# Patient Record
Sex: MALE | Race: WHITE | Employment: PART TIME | URBAN - METROPOLITAN AREA
[De-identification: names, ages, dates, MRNs, and addresses within clinical notes are randomized per-mention and may not be internally consistent; named-entity substitution may affect disease eponyms.]

---

## 2021-03-24 ENCOUNTER — OFFICE VISIT (OUTPATIENT)
Dept: FAMILY MEDICINE CLINIC | Facility: CLINIC | Age: 21
End: 2021-03-24
Payer: COMMERCIAL

## 2021-03-24 VITALS
SYSTOLIC BLOOD PRESSURE: 110 MMHG | BODY MASS INDEX: 20.44 KG/M2 | RESPIRATION RATE: 18 BRPM | HEART RATE: 88 BPM | DIASTOLIC BLOOD PRESSURE: 70 MMHG | WEIGHT: 138 LBS | HEIGHT: 69 IN

## 2021-03-24 DIAGNOSIS — R13.10 DIFFICULTY SWALLOWING SOLIDS: ICD-10-CM

## 2021-03-24 DIAGNOSIS — Z00.00 ROUTINE GENERAL MEDICAL EXAMINATION AT A HEALTH CARE FACILITY: Primary | ICD-10-CM

## 2021-03-24 PROCEDURE — 3008F BODY MASS INDEX DOCD: CPT | Performed by: FAMILY MEDICINE

## 2021-03-24 PROCEDURE — 3074F SYST BP LT 130 MM HG: CPT | Performed by: FAMILY MEDICINE

## 2021-03-24 PROCEDURE — 99385 PREV VISIT NEW AGE 18-39: CPT | Performed by: FAMILY MEDICINE

## 2021-03-24 PROCEDURE — 3078F DIAST BP <80 MM HG: CPT | Performed by: FAMILY MEDICINE

## 2021-03-24 RX ORDER — OMEPRAZOLE 40 MG/1
40 CAPSULE, DELAYED RELEASE ORAL DAILY
Qty: 90 CAPSULE | Refills: 0 | Status: SHIPPED | OUTPATIENT
Start: 2021-03-24

## 2021-03-24 NOTE — PROGRESS NOTES
Priya Angel is a 24year old male with no significant past medical history who presents for an annual physical. Pt is currently enrolled at [facility] of [address] [place] [address]. Patient complains of difficulty swallowing.   Over the last month, he has noted a few episodes wh RRR without murmur  GI: good BS's and no masses, HSM or tenderness  : two descended testes,no masses,no hernia,no penile lesions  MUSCULOSKELETAL: back is not tender and FROM of the back, no evidence of scoliosis  EXTREMITIES: no deformity, no swelling

## 2021-03-24 NOTE — PATIENT INSTRUCTIONS
Prevention Guidelines, Men Ages 25 to 44  Screening tests and vaccines are an important part of managing your health. A screening test is done to find possible disorders or diseases in people who don't have any symptoms.  The goal is to find a disease ear vaccine 2 doses; the second dose should be given at least 4 weeks after the first dose   Hepatitis A Men at increased risk for infection – talk with your healthcare provider 2 doses given at least 6 months apart   Hepatitis B Men at increased risk for infe be reminded to avoid intentional tanning and tanning beds. 1Those who are 25years of age, who are not up-to-date on their childhood immunizations, should get all appropriate catch-up vaccines recommended by the CDC.    Usman last reviewed this educat

## 2021-04-18 ENCOUNTER — HOSPITAL ENCOUNTER (EMERGENCY)
Facility: HOSPITAL | Age: 21
Discharge: HOME | End: 2021-04-18
Attending: PEDIATRICS
Payer: COMMERCIAL

## 2021-04-18 ENCOUNTER — APPOINTMENT (OUTPATIENT)
Dept: GENERAL RADIOLOGY | Facility: HOSPITAL | Age: 21
End: 2021-04-18
Attending: PEDIATRICS
Payer: COMMERCIAL

## 2021-04-18 VITALS
WEIGHT: 139 LBS | DIASTOLIC BLOOD PRESSURE: 71 MMHG | SYSTOLIC BLOOD PRESSURE: 112 MMHG | HEART RATE: 78 BPM | BODY MASS INDEX: 20.59 KG/M2 | RESPIRATION RATE: 20 BRPM | HEIGHT: 69 IN | OXYGEN SATURATION: 97 % | TEMPERATURE: 98 F

## 2021-04-18 DIAGNOSIS — R04.2 HEMOPTYSIS: Primary | ICD-10-CM

## 2021-04-18 PROCEDURE — 99283 EMERGENCY DEPT VISIT LOW MDM: CPT

## 2021-04-18 PROCEDURE — 71045 X-RAY EXAM CHEST 1 VIEW: CPT | Performed by: PEDIATRICS

## 2021-04-18 RX ORDER — CETIRIZINE HYDROCHLORIDE 10 MG/1
10 TABLET ORAL DAILY
COMMUNITY

## 2021-04-18 NOTE — ED INITIAL ASSESSMENT (HPI)
States while he was in the shower he had the urge to spit. States some blood was in his saliva. Denies coughing up blood. Denies fever, cough, unusual bruising or rashes.

## 2021-04-19 NOTE — ED PROVIDER NOTES
Patient Seen in: [facility] Emergency Department      History   Patient presents with:   Other    Stated Complaint: Pt rpt spitting blood about 1hour ago; denies vomiting blood or coughing blood,*    HPI/Subjective:   HPI    20-year-old male previous membrane and external ear normal.      Left Ear: Tympanic membrane and external ear normal.      Nose: Nose normal.      Mouth/Throat:      Mouth: Mucous membranes are moist.      Pharynx: No oropharyngeal exudate or posterior oropharyngeal erythema. independently visualized and interpreted by myself, along with review of radiologist's interpretation. XR CHEST AP PORTABLE  (CPT=71045)    Result Date: 4/18/2021  CONCLUSION:  No active disease seen.   Dictated by (CST): Cyd Kehr, MD on 4/18/2021 [id number]  [phone]    As needed, If symptoms worsen          Medications Prescribed:  Discharge Medication List as of 4/18/2021  8:03 PM

## 2021-05-23 ENCOUNTER — IMMUNIZATION (OUTPATIENT)
Dept: LAB | Facility: HOSPITAL | Age: 21
End: 2021-05-23
Attending: EMERGENCY MEDICINE
Payer: COMMERCIAL

## 2021-05-23 DIAGNOSIS — Z23 NEED FOR VACCINATION: Primary | ICD-10-CM

## 2021-05-23 PROCEDURE — 0001A SARSCOV2 VAC 30MCG/0.3ML IM: CPT

## 2021-06-19 ENCOUNTER — IMMUNIZATION (OUTPATIENT)
Dept: LAB | Facility: HOSPITAL | Age: 21
End: 2021-06-19
Attending: EMERGENCY MEDICINE
Payer: COMMERCIAL

## 2021-06-19 DIAGNOSIS — Z23 NEED FOR VACCINATION: Primary | ICD-10-CM

## 2021-06-19 PROCEDURE — 0002A SARSCOV2 VAC 30MCG/0.3ML IM: CPT

## 2022-01-18 ENCOUNTER — IMMUNIZATION (OUTPATIENT)
Dept: LAB | Facility: HOSPITAL | Age: 22
End: 2022-01-18
Attending: EMERGENCY MEDICINE
Payer: COMMERCIAL

## 2022-01-18 DIAGNOSIS — Z23 NEED FOR VACCINATION: Primary | ICD-10-CM

## 2022-01-18 PROCEDURE — 0054A SARSCOV2 VAC 30MCG TRS SUCR: CPT

## 2023-08-05 ENCOUNTER — HOSPITAL ENCOUNTER (OUTPATIENT)
Age: 23
Discharge: HOME | End: 2023-08-05
Attending: EMERGENCY MEDICINE
Payer: COMMERCIAL

## 2023-08-05 ENCOUNTER — OFFICE VISIT (OUTPATIENT)
Dept: FAMILY MEDICINE CLINIC | Facility: CLINIC | Age: 23
End: 2023-08-05
Payer: COMMERCIAL

## 2023-08-05 VITALS
TEMPERATURE: 97 F | RESPIRATION RATE: 16 BRPM | OXYGEN SATURATION: 100 % | DIASTOLIC BLOOD PRESSURE: 59 MMHG | WEIGHT: 150 LBS | SYSTOLIC BLOOD PRESSURE: 99 MMHG | HEIGHT: 69 IN | HEART RATE: 73 BPM | BODY MASS INDEX: 22.22 KG/M2

## 2023-08-05 VITALS
WEIGHT: 150 LBS | HEIGHT: 69 IN | OXYGEN SATURATION: 99 % | DIASTOLIC BLOOD PRESSURE: 68 MMHG | HEART RATE: 74 BPM | RESPIRATION RATE: 16 BRPM | SYSTOLIC BLOOD PRESSURE: 98 MMHG | BODY MASS INDEX: 22.22 KG/M2 | TEMPERATURE: 98 F

## 2023-08-05 DIAGNOSIS — K52.9 ENTERITIS: Primary | ICD-10-CM

## 2023-08-05 DIAGNOSIS — A09 TRAVELER'S DIARRHEA: ICD-10-CM

## 2023-08-05 DIAGNOSIS — A09 TRAVELER'S DIARRHEA: Primary | ICD-10-CM

## 2023-08-05 PROCEDURE — 99213 OFFICE O/P EST LOW 20 MIN: CPT

## 2023-08-05 RX ORDER — ONDANSETRON 4 MG/1
4 TABLET, ORALLY DISINTEGRATING ORAL EVERY 4 HOURS PRN
Qty: 10 TABLET | Refills: 0 | Status: SHIPPED | OUTPATIENT
Start: 2023-08-05 | End: 2023-08-12

## 2023-08-05 NOTE — DISCHARGE INSTRUCTIONS
Try to collect a stool panel and bring it to outpatient lab at [facility].  Drink frequent small amounts of clear liquids-water, electrolyte fluid, soup broth. Advance to crackers, soft rice, toast as tolerated. Avoid dairy, milk and raw vegetables until you are well. Return for any problems.

## 2023-08-05 NOTE — ED INITIAL ASSESSMENT (HPI)
Patient reports diarrheal stools 3-5 times a day. No blood noted. No fever. Denies urinary symptoms.

## 2023-08-05 NOTE — ED INITIAL ASSESSMENT (HPI)
Patient presents to  with c/o travelers diarrhea x 6 days. Just back from [place].Came from [facility]

## 2024-02-26 ENCOUNTER — OFFICE VISIT (OUTPATIENT)
Dept: FAMILY MEDICINE CLINIC | Facility: CLINIC | Age: 24
End: 2024-02-26
Payer: COMMERCIAL

## 2024-02-26 VITALS
WEIGHT: 165.81 LBS | HEART RATE: 92 BPM | TEMPERATURE: 97 F | RESPIRATION RATE: 18 BRPM | DIASTOLIC BLOOD PRESSURE: 80 MMHG | SYSTOLIC BLOOD PRESSURE: 100 MMHG | HEIGHT: 69 IN | BODY MASS INDEX: 24.56 KG/M2

## 2024-02-26 DIAGNOSIS — S46.811A TRAPEZIUS STRAIN, RIGHT, INITIAL ENCOUNTER: Primary | ICD-10-CM

## 2024-02-26 PROCEDURE — 3079F DIAST BP 80-89 MM HG: CPT | Performed by: FAMILY MEDICINE

## 2024-02-26 PROCEDURE — 99214 OFFICE O/P EST MOD 30 MIN: CPT | Performed by: FAMILY MEDICINE

## 2024-02-26 PROCEDURE — 3008F BODY MASS INDEX DOCD: CPT | Performed by: FAMILY MEDICINE

## 2024-02-26 PROCEDURE — 3074F SYST BP LT 130 MM HG: CPT | Performed by: FAMILY MEDICINE

## 2024-02-26 RX ORDER — CYCLOBENZAPRINE HCL 10 MG
10 TABLET ORAL NIGHTLY PRN
Qty: 20 TABLET | Refills: 0 | Status: SHIPPED | OUTPATIENT
Start: 2024-02-26

## 2024-02-26 RX ORDER — PREDNISONE 10 MG/1
TABLET ORAL
Qty: 30 TABLET | Refills: 0 | Status: SHIPPED | OUTPATIENT
Start: 2024-02-26

## 2024-02-26 NOTE — PROGRESS NOTES
[facility] Family Medicine Office Note  Chief Complaint:   Chief Complaint   Patient presents with    Back Pain       HPI:   This is a 23 year old male coming in for right-sided neck pain. Pain started about 1.5 weeks ago. Inciting event was lifting weights at the gym. Pt doesn't have pain radiating into the arm. Pt denies tingling in the arm/hand. Pt reports weakness in the arm. Pain is worsened by lifting. Pain is lessened by nothing - no improvement with advil.  Pain is 5/10.        No past medical history on file.  No past surgical history on file.  Social History:  Social History     Socioeconomic History    Marital status: Single   Tobacco Use    Smoking status: Never    Smokeless tobacco: Never   Vaping Use    Vaping Use: Never used   Substance and Sexual Activity    Alcohol use: Never    Drug use: Never     Family History:  Family History   Problem Relation Age of Onset    Cancer Paternal Grandmother     Cancer Paternal Grandfather      Allergies:  Allergies   Allergen Reactions    Lactose OTHER (SEE COMMENTS)     Digestion problem     Current Meds:  Current Outpatient Medications   Medication Sig Dispense Refill    predniSONE 10 MG Oral Tab 4 tabs by mouth daily x4 days, 3 tabs by mouth daily x3 days, 2 tabs by mouth daily x2 days, 1 tab by mouth daily x1 day 30 tablet 0    cyclobenzaprine 10 MG Oral Tab Take 1 tablet (10 mg total) by mouth nightly as needed. 20 tablet 0    cetirizine 10 MG Oral Tab Take 1 tablet (10 mg total) by mouth daily.        Counseling given: Not Answered       REVIEW OF SYSTEMS:   ROS:  CONSTITUTIONAL:  Denies any unusual weight gain/loss, fever, chills, weakness or fatigue.  CARDIOVASCULAR:  Denies chest pain, chest pressure or chest discomfort. No palpitations or edema.  Denies any dyspnea on exertion or at rest  RESPIRATORY:  Denies shortness of breath, cough  GASTROINTESTINAL:  Denies any abdominal pain, nausea, vomiting  NEUROLOGICAL:  Denies headache, dizziness,  syncope, numbness or tingling in the extremities.  MUSCULOSKELETAL:  + neck pain    EXAM:   /80   Pulse 92   Temp 97 °F (36.1 °C) (Temporal)   Resp 18   Ht 5' 9\" (1.753 m)   Wt 165 lb 12.8 oz (75.2 kg)   BMI 24.48 kg/m²  Estimated body mass index is 24.48 kg/m² as calculated from the following:    Height as of this encounter: 5' 9\" (1.753 m).    Weight as of this encounter: 165 lb 12.8 oz (75.2 kg).   Vital signs reviewed.Appears stated age, well groomed.  Physical Exam:  GEN:  Patient is alert and oriented x3, no apparent distress  HEAD:  Normocephalic, atraumatic  NECK: + tight right trapezius muscle  LUNGS: clear to auscultation bilaterally, no rales/rhonchi/wheezing  HEART:  Regular rate and rhythm, no murmurs, rubs or gallops  ABDOMEN:  Soft, nondistended, nontender, bowel sounds normal in all 4 quadrants, no hepatosplenomegaly  EXTREMITIES:  Strength intact with 5/5 bilaterally upper and lower extremities, no edema noted  NEURO:  CN 2 - 12 grossly intact     ASSESSMENT AND PLAN:   1. Trapezius strain, right, initial encounter  -  new onset  -  start prednisone taper  -  flexeril PRN  -  recommend ice/heat alternating  -  start PT if no improvement  -  f/u in 1-2 months for annual physical exam  - predniSONE 10 MG Oral Tab; 4 tabs by mouth daily x4 days, 3 tabs by mouth daily x3 days, 2 tabs by mouth daily x2 days, 1 tab by mouth daily x1 day  Dispense: 30 tablet; Refill: 0  - cyclobenzaprine 10 MG Oral Tab; Take 1 tablet (10 mg total) by mouth nightly as needed.  Dispense: 20 tablet; Refill: 0  - Physical Therapy Referral - [facility]      Meds & Refills for this Visit:  Requested Prescriptions     Signed Prescriptions Disp Refills    predniSONE 10 MG Oral Tab 30 tablet 0     Si tabs by mouth daily x4 days, 3 tabs by mouth daily x3 days, 2 tabs by mouth daily x2 days, 1 tab by mouth daily x1 day    cyclobenzaprine 10 MG Oral Tab 20 tablet 0     Sig: Take 1 tablet (10 mg total) by mouth  nightly as needed.       Health Maintenance:  Health Maintenance Due   Topic Date Due    HPV Vaccines (1 - Male 3-dose series) Never done    DTaP,Tdap,and Td Vaccines (1 - Tdap) Never done    Annual Physical  03/24/2022    COVID-19 Vaccine (4 - 2023-24 season) 09/01/2023    Influenza Vaccine (1) Never done    Annual Depression Screening  Never done       Patient/Caregiver Education: Patient/Caregiver Education: There are no barriers to learning. Medical education done.   Outcome: Patient verbalizes understanding. Patient is notified to call with any questions, complications, allergies, or worsening or changing symptoms.  Patient is to call with any side effects or complications from the treatments as a result of today.     Problem List:  There is no problem list on file for this patient.      PRISCILLA ESPINOZA, DO    Please note that portions of this note may have been completed with a voice recognition program. Efforts were made to edit the dictations but occasionally words are mis-transcribed.

## 2025-04-30 ENCOUNTER — OFFICE VISIT (OUTPATIENT)
Dept: FAMILY MEDICINE CLINIC | Facility: CLINIC | Age: 25
End: 2025-04-30
Payer: COMMERCIAL

## 2025-04-30 VITALS
WEIGHT: 165 LBS | DIASTOLIC BLOOD PRESSURE: 56 MMHG | TEMPERATURE: 100 F | OXYGEN SATURATION: 97 % | SYSTOLIC BLOOD PRESSURE: 110 MMHG | HEART RATE: 108 BPM | BODY MASS INDEX: 24 KG/M2 | RESPIRATION RATE: 18 BRPM

## 2025-04-30 DIAGNOSIS — R68.89 FLU-LIKE SYMPTOMS: ICD-10-CM

## 2025-04-30 DIAGNOSIS — J06.9 URI WITH COUGH AND CONGESTION: Primary | ICD-10-CM

## 2025-04-30 PROCEDURE — 3078F DIAST BP <80 MM HG: CPT | Performed by: PHYSICIAN ASSISTANT

## 2025-04-30 PROCEDURE — 99213 OFFICE O/P EST LOW 20 MIN: CPT | Performed by: PHYSICIAN ASSISTANT

## 2025-04-30 PROCEDURE — 87637 SARSCOV2&INF A&B&RSV AMP PRB: CPT | Performed by: PHYSICIAN ASSISTANT

## 2025-04-30 PROCEDURE — 3074F SYST BP LT 130 MM HG: CPT | Performed by: PHYSICIAN ASSISTANT

## 2025-04-30 RX ORDER — OSELTAMIVIR PHOSPHATE 75 MG/1
75 CAPSULE ORAL 2 TIMES DAILY
Qty: 10 CAPSULE | Refills: 0 | Status: SHIPPED | OUTPATIENT
Start: 2025-04-30 | End: 2025-05-05

## 2025-04-30 NOTE — PROGRESS NOTES
CHIEF COMPLAINT:     Chief Complaint   Patient presents with    Fever     X yesterday thought it was allergies, had headache, chills could not sleep, tried to vomit this morning, could not vomit.  Still has headache       HPI:   Kailee Wayne is a 24 year old male who presents for cold symptoms for 1 day.  100.0 degree temp at home. Recently took Advil. + chills. + headache. + nasal congestion. No ear pain. Mild sore throat. Mild cough. No chest pain or SOB. + nausea this am, no vomiting. No diarrhea. No abdominal pain. No covid at home testing. Taking advil OTC. Has not been drinking much water today       Current Medications[1]   Past Medical History[2]   Past Surgical History[3]   Family History[4]   Short Social Hx on File[5]      REVIEW OF SYSTEMS:   GENERAL:  denies  diminished appetite  SKIN: no rashes or abnormal skin lesions  HEENT: See HPI.    LUNGS: denies shortness of breath or wheezing, See HPI  CARDIOVASCULAR: denies chest pain or palpitations   GI: denies V/C or abdominal pain  NEURO: + headaches.  No numbness or tingling in face.    EXAM:   /56   Pulse 108   Temp 99.8 °F (37.7 °C) (Oral)   Resp 18   Wt 165 lb (74.8 kg)   SpO2 97%   BMI 24.37 kg/m²   Physical Exam  Constitutional:       General: He is not in acute distress.     Appearance: Normal appearance.   HENT:      Head: Normocephalic.      Right Ear: Tympanic membrane, ear canal and external ear normal.      Left Ear: Tympanic membrane, ear canal and external ear normal.      Nose: Rhinorrhea present.      Mouth/Throat:      Mouth: Mucous membranes are moist.      Pharynx: Oropharynx is clear. Uvula midline. Posterior oropharyngeal erythema (post nasal drip) present.      Tonsils: No tonsillar exudate.   Eyes:      Conjunctiva/sclera: Conjunctivae normal.      Pupils: Pupils are equal, round, and reactive to light.   Cardiovascular:      Rate and Rhythm: Normal rate and regular rhythm.      Heart sounds: Normal heart sounds. No  murmur heard.  Pulmonary:      Effort: Pulmonary effort is normal. No respiratory distress.      Breath sounds: Normal breath sounds. No wheezing or rhonchi.   Chest:      Chest wall: No tenderness.   Musculoskeletal:      Cervical back: Normal range of motion and neck supple.   Lymphadenopathy:      Cervical: No cervical adenopathy.   Skin:     General: Skin is warm.      Findings: No rash.   Neurological:      Mental Status: He is alert and oriented to person, place, and time.          No results found for this or any previous visit (from the past 24 hours).    ASSESSMENT AND PLAN:   Kailee Wayne is a 24 year old male who presents with URI symptoms that are consistent with:      ASSESSMENT:  Encounter Diagnoses   Name Primary?    Flu-like symptoms     URI with cough and congestion Yes     Quad test sent out   Tamiflu to hold if influenza is positive       PLAN: Meds as below.  Comfort care instructions as listed in Patient Instructions    Meds & Refills for this Visit:  Requested Prescriptions     Signed Prescriptions Disp Refills    oseltamivir 75 MG Oral Cap 10 capsule 0     Sig: Take 1 capsule (75 mg total) by mouth 2 (two) times daily for 5 days.       Risks, benefits, side effects of medication addressed and explained.    Patient Instructions   Rest   Push fluids   Tylenol or ibuprofen OTC as needed for pain/fever   Zyrtec OTC once daily for nasal drainage  Delsym OTC as needed for cough   If influenza testing positive, ok to start Tamiflu   Contagious until fever free for 24 hours without the help of medications ( tylenol or ibuprofen)    Please follow up with PCP if no improvement or if symptoms worsen      The patient indicates understanding of these issues and agrees to the plan.  The patient is asked to f/u with PCP if sx's persist or worsen.         [1]   Current Outpatient Medications   Medication Sig Dispense Refill    oseltamivir 75 MG Oral Cap Take 1 capsule (75 mg total) by mouth 2 (two)  times daily for 5 days. 10 capsule 0    cetirizine 10 MG Oral Tab Take 1 tablet (10 mg total) by mouth daily.      predniSONE 10 MG Oral Tab 4 tabs by mouth daily x4 days, 3 tabs by mouth daily x3 days, 2 tabs by mouth daily x2 days, 1 tab by mouth daily x1 day (Patient not taking: Reported on 4/30/2025) 30 tablet 0    cyclobenzaprine 10 MG Oral Tab Take 1 tablet (10 mg total) by mouth nightly as needed. (Patient not taking: Reported on 4/30/2025) 20 tablet 0   [2] No past medical history on file.  [3] No past surgical history on file.  [4]   Family History  Problem Relation Age of Onset    Cancer Paternal Grandmother     Cancer Paternal Grandfather    [5]   Social History  Socioeconomic History    Marital status: Single   Tobacco Use    Smoking status: Never    Smokeless tobacco: Never   Vaping Use    Vaping status: Never Used   Substance and Sexual Activity    Alcohol use: Never    Drug use: Never

## 2025-04-30 NOTE — PATIENT INSTRUCTIONS
Rest   Push fluids   Tylenol or ibuprofen OTC as needed for pain/fever   Zyrtec OTC once daily for nasal drainage  Delsym OTC as needed for cough   If influenza testing positive, ok to start Tamiflu   Contagious until fever free for 24 hours without the help of medications ( tylenol or ibuprofen)    Please follow up with PCP if no improvement or if symptoms worsen

## 2025-05-01 LAB
FLUAV + FLUBV RNA SPEC NAA+PROBE: NEGATIVE
FLUAV + FLUBV RNA SPEC NAA+PROBE: NEGATIVE
RSV RNA SPEC NAA+PROBE: NEGATIVE
SARS-COV-2 RNA RESP QL NAA+PROBE: DETECTED

## (undated) NOTE — LETTER
Date: 4/30/2025    Patient Name: Kailee Wayne          To Whom it may concern:    This letter has been written at the patient's request. The above patient was seen at [facility] for treatment of a medical condition.    This patient should be excused from attending school 4/30/25 and until 24 hours fever free     Sincerely,    Teresa Muñoz PA-C